# Patient Record
Sex: MALE | Race: AMERICAN INDIAN OR ALASKA NATIVE
[De-identification: names, ages, dates, MRNs, and addresses within clinical notes are randomized per-mention and may not be internally consistent; named-entity substitution may affect disease eponyms.]

---

## 2017-01-06 ENCOUNTER — HOSPITAL ENCOUNTER (INPATIENT)
Dept: HOSPITAL 5 - ED | Age: 32
LOS: 4 days | Discharge: HOME | DRG: 203 | End: 2017-01-10
Attending: HOSPITALIST | Admitting: INTERNAL MEDICINE
Payer: COMMERCIAL

## 2017-01-06 DIAGNOSIS — F17.210: ICD-10-CM

## 2017-01-06 DIAGNOSIS — Z82.49: ICD-10-CM

## 2017-01-06 DIAGNOSIS — J45.901: Primary | ICD-10-CM

## 2017-01-06 DIAGNOSIS — Z98.890: ICD-10-CM

## 2017-01-06 LAB
ANION GAP SERPL CALC-SCNC: 19 MMOL/L
APTT BLD: 35 SEC. (ref 24.2–36.6)
BASOPHILS NFR BLD AUTO: 0.4 % (ref 0–1.8)
BUN SERPL-MCNC: 18 MG/DL (ref 9–20)
BUN/CREAT SERPL: 12 %
CALCIUM SERPL-MCNC: 9.2 MG/DL (ref 8.4–10.2)
CHLORIDE SERPL-SCNC: 99.2 MMOL/L (ref 98–107)
CK SERPL-CCNC: 212 UNITS/L (ref 55–170)
CO2 SERPL-SCNC: 23 MMOL/L (ref 22–30)
EOSINOPHIL NFR BLD AUTO: 0.5 % (ref 0–4.3)
GLUCOSE SERPL-MCNC: 93 MG/DL (ref 75–100)
HCT VFR BLD CALC: 46.4 % (ref 35.5–45.6)
HGB BLD-MCNC: 15.6 GM/DL (ref 11.8–15.2)
INR PPP: 1.05 (ref 0.87–1.13)
MCH RBC QN AUTO: 27 PG (ref 28–32)
MCHC RBC AUTO-ENTMCNC: 34 % (ref 32–34)
MCV RBC AUTO: 81 FL (ref 84–94)
PLATELET # BLD: 215 K/MM3 (ref 140–440)
POTASSIUM SERPL-SCNC: 4.3 MMOL/L (ref 3.6–5)
RBC # BLD AUTO: 5.75 M/MM3 (ref 3.65–5.03)
SODIUM SERPL-SCNC: 137 MMOL/L (ref 137–145)
WBC # BLD AUTO: 12.3 K/MM3 (ref 4.5–11)

## 2017-01-06 PROCEDURE — 93005 ELECTROCARDIOGRAM TRACING: CPT

## 2017-01-06 PROCEDURE — 96366 THER/PROPH/DIAG IV INF ADDON: CPT

## 2017-01-06 PROCEDURE — 36415 COLL VENOUS BLD VENIPUNCTURE: CPT

## 2017-01-06 PROCEDURE — 93010 ELECTROCARDIOGRAM REPORT: CPT

## 2017-01-06 PROCEDURE — 82553 CREATINE MB FRACTION: CPT

## 2017-01-06 PROCEDURE — 85610 PROTHROMBIN TIME: CPT

## 2017-01-06 PROCEDURE — 90686 IIV4 VACC NO PRSV 0.5 ML IM: CPT

## 2017-01-06 PROCEDURE — 96365 THER/PROPH/DIAG IV INF INIT: CPT

## 2017-01-06 PROCEDURE — 84484 ASSAY OF TROPONIN QUANT: CPT

## 2017-01-06 PROCEDURE — 96367 TX/PROPH/DG ADDL SEQ IV INF: CPT

## 2017-01-06 PROCEDURE — 85730 THROMBOPLASTIN TIME PARTIAL: CPT

## 2017-01-06 PROCEDURE — 80048 BASIC METABOLIC PNL TOTAL CA: CPT

## 2017-01-06 PROCEDURE — 82140 ASSAY OF AMMONIA: CPT

## 2017-01-06 PROCEDURE — 94640 AIRWAY INHALATION TREATMENT: CPT

## 2017-01-06 PROCEDURE — 85025 COMPLETE CBC W/AUTO DIFF WBC: CPT

## 2017-01-06 PROCEDURE — 99406 BEHAV CHNG SMOKING 3-10 MIN: CPT

## 2017-01-06 PROCEDURE — 83880 ASSAY OF NATRIURETIC PEPTIDE: CPT

## 2017-01-06 PROCEDURE — 85007 BL SMEAR W/DIFF WBC COUNT: CPT

## 2017-01-06 PROCEDURE — 96375 TX/PRO/DX INJ NEW DRUG ADDON: CPT

## 2017-01-06 PROCEDURE — 82550 ASSAY OF CK (CPK): CPT

## 2017-01-06 PROCEDURE — 71275 CT ANGIOGRAPHY CHEST: CPT

## 2017-01-06 PROCEDURE — 90732 PPSV23 VACC 2 YRS+ SUBQ/IM: CPT

## 2017-01-06 PROCEDURE — 71020: CPT

## 2017-01-06 RX ADMIN — IPRATROPIUM BROMIDE ONE MG: 0.5 SOLUTION RESPIRATORY (INHALATION) at 16:28

## 2017-01-06 RX ADMIN — IPRATROPIUM BROMIDE ONE MG: 0.5 SOLUTION RESPIRATORY (INHALATION) at 19:44

## 2017-01-06 NOTE — EMERGENCY DEPARTMENT REPORT
Addendum entered and electronically signed by DOC HUTCHINSON PA  01/06/17 19:10

: 








Blank Doc





- Documentation


Documentation: 





Charged nurse notified the patient's pulse ox is 95% and needs supplemental 

oxygen.  I also informed her of that patient will needs to come back to main ED 

immediately.  Patient remained short of breath after respiratory treatment.  

Albuterol nebulizer ordered and CT angiogram of the chest ordered.  Patient 

notified as additional treatment plan. O2 sat  97% on 2 L of oxygen.





Original Note:





Chief Complaint: Dyspnea/Respdistress


Stated Complaint: ASTHMA/VOMITING/SOB/CHILLS


Time Seen by Provider: 01/06/17 16:12





- HPI


History of Present Illness: 





Patient here reports difficulty breathing and was diagnosed with pneumonia in 12 -2016.  He said he was diagnosed at [facility].  He reports that he is 

having fever and chills.  Patient is a positive smoker.  Patient reports that 

he has asthma and he uses albuterol nebulizer and inhaler.  O2 sat is coughing 

and is having some tightness in his chest that he usually gets with his asthma 

but he feels like he has pneumonia.  Patient reports nausea.





- ROS


Review of Systems: 





All systems are negative unless stated in HPI above.





- Exam


Vital Signs: 


 Vital Signs











  01/06/17





  15:12


 


Temperature 99.0 F


 


Pulse Rate 124 H


 


Respiratory 24





Rate 


 


Blood Pressure 139/80


 


O2 Sat by Pulse 96





Oximetry 











Physical Exam: 





General: This is a 31-year-old male well-nourished well-developed in no acute 

distress.


Lungs: Dry cough, mild increased work of breathing.  No wheezing.


CV: Tachycardic at 124, regular rhythm.  S1-S2


MSE screening note: 


Focused history and physical exam performed.


Due to findings the following was ordered:See [facility]











ED Medical Decision Making





- Medical Decision Making





Medical decision making: Patient seen by provider in triage area.  Appropriate 

protocol activated and patient to main ED to be seen by physician.





ED Disposition for MSE


Condition: Stable

## 2017-01-06 NOTE — XRAY REPORT
FINAL REPORT



EXAM:  XR CHEST ROUTINE 2V



HISTORY:  Shortness of breath 



TECHNIQUE:  PA and lateral views of the chest



PRIORS:  CT chest 01/06/2017



FINDINGS:  

Lines, tubes, and devices: N/A



Lungs and pleura: Trachea is normal in position.  Lungs are clear

of infiltrate, pleural effusion, vascular congestion, or

pneumothorax. The tiny 6 mm left upper lobe nodule seen on recent

CT is not well-visualized by x-ray. This should be followed with

CT. 



Cardiomediastinal silhouette: Cardiac and mediastinal silhouettes

are unremarkable.



Other: Bony structures are intact.



IMPRESSION:  

No acute cardiopulmonary process seen.

## 2017-01-06 NOTE — CAT SCAN REPORT
FINAL REPORT



EXAM:  CT ANGIO CHEST



HISTORY:  SOB 



TECHNIQUE:  Enhanced CT of the chest at 2.5 mm axial intervals

following a pulmonary embolism protocol. Coronal and sagittal

imaging were also obtained. 



Contrast: 100 ml of Omnipaque 350 given IV.



PRIORS:  CXR 01/06/2017



FINDINGS:  

There is no evidence for pulmonary embolism in the main pulmonary

artery, right and left pulmonary arteries or their major

distributions. However, CT does not exclude distal pulmonary

emboli.



There is a nonspecific subpleural 6 mm nodule in the left upper

lobe (coronal image 40, axial image 57). Otherwise, the lung

parenchyma are expanded and clear with no evidence for

parenchymal infiltrates, congestion, or pleural effusion.  There

is no evidence for mediastinal, hilar, or axillary adenopathy.

Cardiovascular structures are within normal limits.



Images through the lung bases include the upper abdomen which

show no abnormalities of the visualized abdominal viscera.



Bony structures demonstrate no focal abnormalities.



IMPRESSION:  

1. no evidence for pulmonary embolism. 



2. nonspecific uncalcified nodule in the left upper lobe

laterally. Given the size, follow-up CT in 1 years recommended

unless the patient is a high-risk patient (history of smoking or

primary neoplasm). In the latter instance, follow-up CT in 6 and

12 months is warranted.

## 2017-01-06 NOTE — EMERGENCY DEPARTMENT REPORT
HPI





- General


Chief Complaint: Dyspnea/Respdistress


Time Seen by Provider: 01/06/17 16:12





- HPI


HPI: 


Room 22





The patient is a 31-year-old male presenting with a chief complaint of chest 

pain and shortness of breath.  The patient states yesterday he developed 

symptoms similar to his previous bout of pneumonia which included anterior 

chest pain that feels as though something is staying on his chest associated 

with shortness of breath and a cough productive of green sputum.  Patient does 

admit to nausea vomiting.  Patient gives his pain a score of 6/10.  The patient 

denies any recent flights/car trips





Location: Chest


Duration: Constant since yesterday


Quality: Pressure


Severity: 6/10


Modifying factors: [see above]


Context: [see above]


Mode of transportation: [not driving]








ED Past Medical Hx





- Past Medical History


Previous Medical History?: No


Hx Asthma: Yes





- Surgical History


Past Surgical History?: Yes


Additional Surgical History: Hernia repair





- Family History


Family history: no significant





- Social History


Smoking Status: Current Every Day Smoker


Substance Use Type: Alcohol (occasional)





- Medications


Home Medications: 


 Home Medications











 Medication  Instructions  Recorded  Confirmed  Last Taken  Type


 


ALBUTEROL Inhaler 2 puff IH Q4H 10/10/16 10/10/16 Unknown History














ED Review of Systems


ROS: 


Stated complaint: ASTHMA/VOMITING/SOB/CHILLS


Other details as noted in HPI





Comment: All other systems reviewed and negative


Constitutional: denies: chills, fever


Eyes: denies: eye pain, eye discharge, vision change


ENT: denies: ear pain, throat pain


Respiratory: cough, shortness of breath, wheezing


Cardiovascular: denies: palpitations


Endocrine: no symptoms reported


Gastrointestinal: nausea, vomiting


Genitourinary: denies: urgency, dysuria


Musculoskeletal: denies: back pain, joint swelling, arthralgia


Skin: denies: rash, lesions


Neurological: denies: headache, weakness, paresthesias


Psychiatric: denies: anxiety, depression


Hematological/Lymphatic: denies: easy bleeding, easy bruising





Physical Exam





- Physical Exam


Vital Signs: 


 Vital Signs











  01/06/17





  15:12


 


Temperature 99.0 F


 


Pulse Rate 124 H


 


Respiratory 24





Rate 


 


Blood Pressure 139/80


 


O2 Sat by Pulse 96





Oximetry 











Physical Exam: 


GENERAL: The patient is well-developed well-nourished male lying on stretcher 

appearing in mild discomfort. []


HEENT: Normocephalic.  Atraumatic.  Extraocular motions are intact.  Patient 

has moist mucous membranes.


NECK: Supple.  Trachea midline


CHEST/LUNGS: Diminished breath sounds throughout.  


HEART/CARDIOVASCULAR: Regular.  There is tachycardia.  There is no gallop rub 

or murmur.


ABDOMEN: Abdomen is soft, nontender.  Patient has normal bowel sounds.  There 

is no abdominal distention.


SKIN: There is no rash.  There is no edema.  There is no diaphoresis.


NEURO: The patient is awake, alert, and oriented.  The patient is cooperative. 

  The patient has normal speech


MUSCULOSKELETAL:  There is no evidence of acute injury.








ED Course


 Vital Signs











  01/06/17





  15:12


 


Temperature 99.0 F


 


Pulse Rate 124 H


 


Respiratory 24





Rate 


 


Blood Pressure 139/80


 


O2 Sat by Pulse 96





Oximetry 














ED Medical Decision Making





- Lab Data


Result diagrams: 


 01/06/17 16:27





 01/06/17 16:27





 Laboratory Tests











  01/06/17 01/06/17 01/06/17





  16:27 16:27 16:27


 


WBC   12.3 H 


 


RBC   5.75 H 


 


Hgb   15.6 H 


 


Hct   46.4 H 


 


MCV   81 L 


 


MCH   27 L 


 


MCHC   34 


 


RDW   14.8 


 


Plt Count   215 


 


Lymph % (Auto)   3.6 L 


 


Mono % (Auto)   12.3 H 


 


Eos % (Auto)   0.5 


 


Baso % (Auto)   0.4 


 


Lymph #   0.4 L 


 


Mono #   1.5 H 


 


Eos #   0.1 


 


Baso #   0.1 


 


Seg Neutrophils %   83.2 H 


 


Seg Neutrophils #   10.2 H 


 


PT   


 


INR   


 


APTT   


 


Sodium  137  


 


Potassium  4.3  


 


Chloride  99.2  


 


Carbon Dioxide  23  


 


Anion Gap  19  


 


BUN  18  


 


Creatinine  1.5  


 


Estimated GFR  > 60  


 


BUN/Creatinine Ratio  12.00  


 


Glucose  93  


 


Lactic Acid    1.5


 


Calcium  9.2  


 


Total Creatine Kinase   


 


CK-MB (CK-2)   


 


CK-MB (CK-2) Rel Index   


 


Troponin T   


 


NT-Pro-B Natriuret Pep   














  01/06/17 01/06/17





  19:10 19:10


 


WBC  


 


RBC  


 


Hgb  


 


Hct  


 


MCV  


 


MCH  


 


MCHC  


 


RDW  


 


Plt Count  


 


Lymph % (Auto)  


 


Mono % (Auto)  


 


Eos % (Auto)  


 


Baso % (Auto)  


 


Lymph #  


 


Mono #  


 


Eos #  


 


Baso #  


 


Seg Neutrophils %  


 


Seg Neutrophils #  


 


PT   13.6


 


INR   1.05


 


APTT   35.0


 


Sodium  


 


Potassium  


 


Chloride  


 


Carbon Dioxide  


 


Anion Gap  


 


BUN  


 


Creatinine  


 


Estimated GFR  


 


BUN/Creatinine Ratio  


 


Glucose  


 


Lactic Acid  


 


Calcium  


 


Total Creatine Kinase  212 H 


 


CK-MB (CK-2)  1.3 


 


CK-MB (CK-2) Rel Index  0.6 


 


Troponin T  < 0.010 


 


NT-Pro-B Natriuret Pep  5.73 

















- EKG Data


-: EKG Interpreted by Me


EKG shows normal: sinus rhythm


Rate: normal





- EKG Data


When compared to previous EKG there are: previous EKG unavailable


Interpretation: other (no ischemic changes seen)





- Radiology Data


Radiology results: report reviewed (CT chest), image reviewed (chest x-ray, CT 

chest)


interpreted by me: 


Chest x-ray-no focal infiltrates, no pneumothorax





CT chest (read by radiologist)-no evidence for pulmonary embolism.  Nonspecific 

noncalcified nodule in the left upper lobe laterally.  Given the size, follow-

up CT in 1 year is recommended unless the patient is at high risk patient.  In 

the latter instance, follow-up CT in 6 and 12 months is warranted








- Differential Diagnosis


acute asthma exacerbation, pneumonia, pneumothorax, PE


Critical care attestation.: 


If time is entered above; I have spent that time in minutes in the direct care 

of this critically ill patient, excluding procedure time.








ED Disposition


Clinical Impression: 


 Shortness of breath, Acute asthma exacerbation


Disposition: OP ADMITTED AS IP TO THIS HOSP


Is pt being admited?: Yes


Does the pt Need Aspirin: Yes


Condition: Fair


Time of Disposition: 21:48 (hospitalist notified)

## 2017-01-06 NOTE — HISTORY AND PHYSICAL REPORT
History of Present Illness


Chief complaint: 


shortness of breath





History of present illness: 


32 YO Male with Asthma, Nicotine Dependence presents to ED for evaluation. Pt 

states that he experienced  shortness of breath over the past day.  The patient 

states symptoms began yesterday after smoking cigarettes. Pt states that 

symptoms are accompanied with dry cough, productive of green sputum, and 

soreness in his chest after coughing. Pt denies fever, chills, CP, palpitations

, NVD, recent ill contacts. 





Past History


Past Medical History: other (asthma)


Past Surgical History: hernia repair


Social history: single, smoking.  denies: alcohol abuse, prescription drug abuse


Family history: hypertension





Medications and Allergies


 Allergies











Allergy/AdvReac Type Severity Reaction Status Date / Time


 


No Known Allergies Allergy   Unverified 10/10/16 00:25











 Home Medications











 Medication  Instructions  Recorded  Confirmed  Last Taken  Type


 


ALBUTEROL Inhaler 2 puff IH Q4H PRN 10/10/16 01/06/17 1 Day Ago History





    2 














Review of Systems


All systems: negative


Respiratory: cough, shortness of breath





Exam





- Constitutional


Vitals: 


 











Temp Pulse Resp BP Pulse Ox


 


 99.9 F H  121 H  16   142/79   100 


 


 01/06/17 19:15  01/06/17 20:56  01/06/17 20:56  01/06/17 20:56  01/06/17 20:56











General appearance: Present: no acute distress, well-nourished





- EENT


Eyes: Present: PERRL


ENT: hearing intact, clear oral mucosa





- Neck


Neck: Present: supple, normal ROM





- Respiratory


Respiratory effort: normal


Respiratory: bilateral: CTA





- Cardiovascular


Heart Sounds: Present: S1 & S2.  Absent: rub, click





- Extremities


Extremities: pulses symmetrical, No edema


Peripheral Pulses: within normal limits





- Abdominal


General gastrointestinal: Present: soft, non-tender, non-distended, normal 

bowel sounds


Male genitourinary: Present: normal





- Integumentary


Integumentary: Present: clear, warm, dry





- Musculoskeletal


Musculoskeletal: gait normal, strength equal bilaterally





- Psychiatric


Psychiatric: appropriate mood/affect, intact judgment & insight





- Neurologic


Neurologic: CNII-XII intact, moves all extremities





Results





- Labs


CBC & Chem 7: 


 01/07/17 05:31





 01/06/17 16:27


Labs: 


 Abnormal lab results











  01/06/17 01/06/17 Range/Units





  16:27 19:10 


 


WBC  12.3 H   (4.5-11.0)  K/mm3


 


RBC  5.75 H   (3.65-5.03)  M/mm3


 


Hgb  15.6 H   (11.8-15.2)  gm/dl


 


Hct  46.4 H   (35.5-45.6)  %


 


MCV  81 L   (84-94)  fl


 


MCH  27 L   (28-32)  pg


 


Lymph % (Auto)  3.6 L   (13.4-35.0)  %


 


Mono % (Auto)  12.3 H   (0.0-7.3)  %


 


Lymph #  0.4 L   (1.2-5.4)  K/mm3


 


Mono #  1.5 H   (0.0-0.8)  K/mm3


 


Seg Neutrophils %  83.2 H   (40.0-70.0)  %


 


Seg Neutrophils #  10.2 H   (1.8-7.7)  K/mm3


 


Total Creatine Kinase   212 H  ()  units/L














Assessment and Plan





- Patient Problems


(1) Acute asthma exacerbation


Current Visit: Yes   Status: Acute   


Plan to address problem: 


Asthma Exacerbation Protocol: Iv abx, steroids, nebulizer, supplemental oxygen, 

education regarding asthma triggers. 











(2) Nicotine dependence


Current Visit: Yes   Status: Acute   


Plan to address problem: 


Pt counseled











(3) DVT prophylaxis


Current Visit: Yes   Status: Acute

## 2017-01-07 LAB
ANISOCYTOSIS BLD QL SMEAR: (no result)
BLASTOCYTES % (MANUAL): 0 %
HCT VFR BLD CALC: 44.1 % (ref 35.5–45.6)
HGB BLD-MCNC: 14.3 GM/DL (ref 11.8–15.2)
MCH RBC QN AUTO: 26 PG (ref 28–32)
MCHC RBC AUTO-ENTMCNC: 32 % (ref 32–34)
MCV RBC AUTO: 81 FL (ref 84–94)
PLATELET # BLD: 212 K/MM3 (ref 140–440)
RBC # BLD AUTO: 5.48 M/MM3 (ref 3.65–5.03)
TOTAL CELLS COUNTED PERCENT: 9
WBC # BLD AUTO: 11.2 K/MM3 (ref 4.5–11)

## 2017-01-07 RX ADMIN — AZITHROMYCIN SCH MG: 250 TABLET, FILM COATED ORAL at 16:35

## 2017-01-07 RX ADMIN — IPRATROPIUM BROMIDE AND ALBUTEROL SULFATE SCH AMPUL: .5; 3 SOLUTION RESPIRATORY (INHALATION) at 14:32

## 2017-01-07 RX ADMIN — IPRATROPIUM BROMIDE AND ALBUTEROL SULFATE SCH AMPUL: .5; 3 SOLUTION RESPIRATORY (INHALATION) at 20:03

## 2017-01-07 RX ADMIN — ALBUTEROL SULFATE PRN MG: 2.5 SOLUTION RESPIRATORY (INHALATION) at 10:17

## 2017-01-07 RX ADMIN — ALBUTEROL SULFATE PRN MG: 2.5 SOLUTION RESPIRATORY (INHALATION) at 05:35

## 2017-01-07 RX ADMIN — ENOXAPARIN SODIUM SCH: 100 INJECTION SUBCUTANEOUS at 22:47

## 2017-01-07 RX ADMIN — IPRATROPIUM BROMIDE AND ALBUTEROL SULFATE SCH: .5; 3 SOLUTION RESPIRATORY (INHALATION) at 15:27

## 2017-01-07 NOTE — PROGRESS NOTE
Assessment and Plan


Assessment and plan: 





1.  Asthma exacerbation


Start antibiotic along with IV corticosteroids, inhaled bronchodilators and 

supplemental oxygen





2.  Tobacco use


Counseled regarding importance of quitting (10 minutes)





3.  Lung nodule


Needs repeat CT chest in 6-12 months





4.  DVT prophylaxis


Lovenox





History


Interval history: 





c/o SOB, wheezing, difficulty breathing, chest tightness





Hospitalist Physical





- Constitutional


Vitals: 


 











Temp Pulse Resp BP Pulse Ox


 


 99.5 F   118 H  14   119/78   100 


 


 01/07/17 14:59  01/07/17 14:59  01/07/17 14:59  01/07/17 14:59  01/07/17 14:59











General appearance: Present: no acute distress, well-nourished





- EENT


Eyes: Present: PERRL, EOM intact.  Absent: scleral icterus, conjunctival 

injection





- Neck


Neck: Present: supple, normal ROM.  Absent: masses or JVD





- Respiratory


Respiratory effort: labored


Respiratory: bilateral: diminished, wheezing, negative: rales, rhonchi





- Cardiovascular


Rhythm: other (tachycardic)


Heart Sounds: Present: S1 & S2.  Absent: systolic murmur





- Extremities


Extremities: no ischemia





- Abdominal


General gastrointestinal: soft, non-tender, non-distended, normal bowel sounds





- Integumentary


Integumentary: Present: warm, dry.  Absent: jaundice, rash





- Psychiatric


Psychiatric: cooperative





- Neurologic


Neurologic: CNII-XII intact, no focal deficits





Results





- Labs


CBC & Chem 7: 


 01/07/17 05:31





 01/06/17 16:27


Labs: 


 Laboratory Last Values











WBC  11.2 K/mm3 (4.5-11.0)  H  01/07/17  05:31    


 


RBC  5.48 M/mm3 (3.65-5.03)  H  01/07/17  05:31    


 


Hgb  14.3 gm/dl (11.8-15.2)   01/07/17  05:31    


 


Hct  44.1 % (35.5-45.6)   01/07/17  05:31    


 


MCV  81 fl (84-94)  L  01/07/17  05:31    


 


MCH  26 pg (28-32)  L  01/07/17  05:31    


 


MCHC  32 % (32-34)   01/07/17  05:31    


 


RDW  15.0 % (13.2-15.2)   01/07/17  05:31    


 


Plt Count  212 K/mm3 (140-440)   01/07/17  05:31    


 


Lymph % (Auto)  3.6 % (13.4-35.0)  L  01/06/17  16:27    


 


Mono % (Auto)  12.3 % (0.0-7.3)  H  01/06/17  16:27    


 


Eos % (Auto)  0.5 % (0.0-4.3)   01/06/17  16:27    


 


Baso % (Auto)  0.4 % (0.0-1.8)   01/06/17  16:27    


 


Lymph #  0.4 K/mm3 (1.2-5.4)  L  01/06/17  16:27    


 


Mono #  1.5 K/mm3 (0.0-0.8)  H  01/06/17  16:27    


 


Eos #  0.1 K/mm3 (0.0-0.4)   01/06/17  16:27    


 


Baso #  0.1 K/mm3 (0.0-0.1)   01/06/17  16:27    


 


Add Manual Diff  Complete   01/07/17  05:31    


 


Total Counted  100   01/07/17  05:31    


 


Seg Neutrophils %  Np   01/07/17  05:31    


 


Seg Neuts % (Manual)  68.0 % (40.0-70.0)   01/07/17  05:31    


 


Band Neutrophils %  12.0 %  01/07/17  05:31    


 


Lymphocytes % (Manual)  11.0 % (13.4-35.0)  L  01/07/17  05:31    


 


Reactive Lymphs % (Man)  0 %  01/07/17  05:31    


 


Monocytes % (Manual)  9.0 % (0.0-7.3)  H  01/07/17  05:31    


 


Eosinophils % (Manual)  0 % (0.0-4.3)   01/07/17  05:31    


 


Basophils % (Manual)  0 % (0.0-1.8)   01/07/17  05:31    


 


Metamyelocytes %  0 %  01/07/17  05:31    


 


Myelocytes %  0 %  01/07/17  05:31    


 


Promyelocytes %  0 %  01/07/17  05:31    


 


Blast Cells %  0 %  01/07/17  05:31    


 


Nucleated RBC %  Not Reportable   01/07/17  05:31    


 


Seg Neutrophils #  10.2 K/mm3 (1.8-7.7)  H  01/06/17  16:27    


 


Seg Neutrophils # Man  7.6 K/mm3 (1.8-7.7)   01/07/17  05:31    


 


Band Neutrophils #  1.3 K/mm3  01/07/17  05:31    


 


Lymphocytes # (Manual)  1.2 K/mm3 (1.2-5.4)   01/07/17  05:31    


 


Abs React Lymphs (Man)  0.0 K/mm3  01/07/17  05:31    


 


Monocytes # (Manual)  1.0 K/mm3 (0.0-0.8)  H  01/07/17  05:31    


 


Eosinophils # (Manual)  0.0 K/mm3 (0.0-0.4)   01/07/17  05:31    


 


Basophils # (Manual)  0.0 K/mm3 (0.0-0.1)   01/07/17  05:31    


 


Metamyelocytes #  0.0 K/mm3  01/07/17  05:31    


 


Myelocytes #  0.0 K/mm3  01/07/17  05:31    


 


Promyelocytes #  0.0 K/mm3  01/07/17  05:31    


 


Blast Cells #  0.0 K/mm3  01/07/17  05:31    


 


WBC Morphology  Not Reportable   01/07/17  05:31    


 


Hypersegmented Neuts  Not Reportable   01/07/17  05:31    


 


Hyposegmented Neuts  Not Reportable   01/07/17  05:31    


 


Hypogranular Neuts  Not Reportable   01/07/17  05:31    


 


Smudge Cells  Not Reportable   01/07/17  05:31    


 


Toxic Granulation  Not Reportable   01/07/17  05:31    


 


Toxic Vacuolation  Not Reportable   01/07/17  05:31    


 


Dohle Bodies  Not Reportable   01/07/17  05:31    


 


Pelger-Huet Anomaly  Not Reportable   01/07/17  05:31    


 


Jose Rods  Not Reportable   01/07/17  05:31    


 


Platelet Estimate  Appe   01/07/17  05:31    


 


Clumped Platelets  Not Reportable   01/07/17  05:31    


 


Plt Clumps, EDTA  Not Reportable   01/07/17  05:31    


 


Large Platelets  Not Reportable   01/07/17  05:31    


 


Giant Platelets  Not Reportable   01/07/17  05:31    


 


Platelet Satelliting  Not Reportable   01/07/17  05:31    


 


Plt Morphology Comment  Not Reportable   01/07/17  05:31    


 


RBC Morphology  Not Reportable   01/07/17  05:31    


 


Dimorphic RBCs  Not Reportable   01/07/17  05:31    


 


Polychromasia  Not Reportable   01/07/17  05:31    


 


Hypochromasia  Not Reportable   01/07/17  05:31    


 


Poikilocytosis  Not Reportable   01/07/17  05:31    


 


Anisocytosis  1+   01/07/17  05:31    


 


Microcytosis  Not Reportable   01/07/17  05:31    


 


Macrocytosis  Not Reportable   01/07/17  05:31    


 


Spherocytes  Not Reportable   01/07/17  05:31    


 


Pappenheimer Bodies  Not Reportable   01/07/17  05:31    


 


Sickle Cells  Not Reportable   01/07/17  05:31    


 


Target Cells  Not Reportable   01/07/17  05:31    


 


Tear Drop Cells  Not Reportable   01/07/17  05:31    


 


Ovalocytes  Not Reportable   01/07/17  05:31    


 


Helmet Cells  Not Reportable   01/07/17  05:31    


 


Leal-Kennedale Bodies  Not Reportable   01/07/17  05:31    


 


Cabot Rings  Not Reportable   01/07/17  05:31    


 


Traci Cells  Not Reportable   01/07/17  05:31    


 


Bite Cells  Not Reportable   01/07/17  05:31    


 


Crenated Cell  Not Reportable   01/07/17  05:31    


 


Elliptocytes  Not Reportable   01/07/17  05:31    


 


Acanthocytes (Spur)  Not Reportable   01/07/17  05:31    


 


Rouleaux  Not Reportable   01/07/17  05:31    


 


Hemoglobin C Crystals  Not Reportable   01/07/17  05:31    


 


Schistocytes  Not Reportable   01/07/17  05:31    


 


Malaria parasites  Not Reportable   01/07/17  05:31    


 


Abdullahi Bodies  Not Reportable   01/07/17  05:31    


 


Hem Pathologist Commnt  No   01/07/17  05:31    


 


PT  13.6 Sec. (12.2-14.9)   01/06/17  19:10    


 


INR  1.05  (0.87-1.13)   01/06/17  19:10    


 


APTT  35.0 Sec. (24.2-36.6)   01/06/17  19:10    


 


Sodium  137 mmol/L (137-145)   01/06/17  16:27    


 


Potassium  4.3 mmol/L (3.6-5.0)   01/06/17  16:27    


 


Chloride  99.2 mmol/L ()   01/06/17  16:27    


 


Carbon Dioxide  23 mmol/L (22-30)   01/06/17  16:27    


 


Anion Gap  19 mmol/L  01/06/17  16:27    


 


BUN  18 mg/dL (9-20)   01/06/17  16:27    


 


Creatinine  1.5 mg/dL (0.8-1.5)   01/06/17  16:27    


 


Estimated GFR  > 60 ml/min  01/06/17  16:27    


 


BUN/Creatinine Ratio  12.00 %  01/06/17  16:27    


 


Glucose  93 mg/dL ()   01/06/17  16:27    


 


Lactic Acid  1.5 mmol/L (0.7-2.0)   01/06/17  16:27    


 


Calcium  9.2 mg/dL (8.4-10.2)   01/06/17  16:27    


 


Total Creatine Kinase  212 units/L ()  H  01/06/17  19:10    


 


CK-MB (CK-2)  1.3 ng/mL (0.0-4.0)   01/06/17  19:10    


 


CK-MB (CK-2) Rel Index  0.6  (0-4)   01/06/17  19:10    


 


Troponin T  < 0.010 ng/mL (0.00-0.029)   01/06/17  19:10    


 


NT-Pro-B Natriuret Pep  5.73 pg/mL (0-450)   01/06/17  19:10    














- Imaging and Cardiology


Chest x-ray: image reviewed (no acute findings)


CT scan - chest: report reviewed (no PE, 6 mm subpleural nodule left upper lung)

## 2017-01-07 NOTE — ADMIT CRITERIA FORM
Admission Criteria Documentation: 





                                             ASTHMA





Clinical Indications for Admission to Inpatient Care





                                                          (Place 'X' for any 

and all applicable criteria):





Admission is indicated for ANY ONE of the following  (1)(2)(3)(4)(5):


[ ]I.        Absent or markedly diminished breath sounds (silent chest)


[ ]II.       Oxygen saturation < 92%                                   


[ ]III.      PaCO2 = / > 42 mm Hg (5.6 kPa)


[ ]IV.     Peak expiratory flow rate < 40% of predicted or personal best after 

treatment.


[ ]V.      Peak expiratory flow rate < 33% of predicted or personal before 

after treatment


[ ]VI.     Change in mental status


[ ]VII.     Ventilatory support required  


[ ]VIII.    PaO2 < 60 mm Hg (8.0 kPa)                              


[ ]IX.      Cyanosis                                                           

  


[ ]X.       Cardiac dysrhythmia (e.g., bradycardia) 


[ ]XI.      Hemodynamic instability


[ ]XII.     Radiographic evidence of complication requiring inpatient treatment 

(e.g., pneumonia, pneumothorax)


[ X]XIII.    Inpatient admission required rather than observation care (also 

use Asthma: Observation Care guideline as appropriate) 


            because of ANY ONE of the following:


            [ X]a)    Respiratory finding that is severe or persistent (eg, 

dyspnea, tachypnea, accessory muscle use)


            [ ]b)    Airflow measurements less than 60% of predicted or 

personal best that persist (e.g., over 24 hours) or worsen despite treatments


            [ ]c)    Supplemental oxygen or respiratory treatments for over 24 

hours that are performable only in acute inpatient setting


            [ ]d)    Other condition, treatment or monitoring requiring 

inpatient admission.











Extended stay beyond goal length of stay may be needed for (26)(27)(28):


[ ]a)   Severe respiratory failure (23) (29) (30)           


[ ]b)   Secondary causes and complications (25)


[ ]c)   Status asthmaticus         


[ ]d)   Chronic obstructive asthma       


[ ]e)   Older patients (29)            


[ ]f)    Slow resolution                 


[ ]g)   Clinically significant exacerbation of comorbidities (eg, gay. heart 

failure, atrial fibrillation)











The original [employer] content created by [employer] has been revised. 


The portions of the content which have been revised are identified through the 

use of italic text or in bold, and [employer] 


has neither reviewed nor approved the modified material. All other unmodified 

content is copyright  [employer]





Please see references footnoted in the original [facility] edition 

2016








Admission Criteria Met: Yes

## 2017-01-08 RX ADMIN — IPRATROPIUM BROMIDE AND ALBUTEROL SULFATE SCH AMPUL: .5; 3 SOLUTION RESPIRATORY (INHALATION) at 07:27

## 2017-01-08 RX ADMIN — IPRATROPIUM BROMIDE AND ALBUTEROL SULFATE SCH AMPUL: .5; 3 SOLUTION RESPIRATORY (INHALATION) at 20:16

## 2017-01-08 RX ADMIN — AZITHROMYCIN SCH MG: 250 TABLET, FILM COATED ORAL at 09:44

## 2017-01-08 RX ADMIN — IPRATROPIUM BROMIDE AND ALBUTEROL SULFATE SCH AMPUL: .5; 3 SOLUTION RESPIRATORY (INHALATION) at 13:04

## 2017-01-08 NOTE — PROGRESS NOTE
Assessment and Plan


Assessment and plan: 





1.  Asthma exacerbation


Started on antibiotic along with IV corticosteroids, inhaled bronchodilators 

and supplemental oxygen


No significant improvement


Add mucinex as he associated productive cough





2.  Tobacco use


Counseled regarding importance of quitting (10 minutes)





3.  Lung nodule


Needs repeat CT chest in 6-12 months





4.  DVT prophylaxis


Lovenox





History


Interval history: 





still c/o SOB and wheezing, associated with productive cough





Hospitalist Physical





- Constitutional


Vitals: 


 











Temp Pulse Resp BP Pulse Ox


 


 98.1 F   117 H  20   120/66   94 


 


 01/08/17 08:00  01/08/17 13:15  01/08/17 13:15  01/08/17 08:00  01/08/17 09:50











General appearance: Present: no acute distress, well-nourished





- EENT


Eyes: Present: PERRL, EOM intact.  Absent: scleral icterus, conjunctival 

injection





- Neck


Neck: Present: supple, normal ROM.  Absent: masses or JVD





- Respiratory


Respiratory effort: normal


Respiratory: bilateral: diminished, wheezing, negative: rales





- Cardiovascular


Rhythm: other (tachycardic)


Heart Sounds: Present: S1 & S2.  Absent: systolic murmur





- Extremities


Extremities: no ischemia





- Abdominal


General gastrointestinal: soft, non-tender, non-distended, normal bowel sounds





- Psychiatric


Psychiatric: cooperative





- Neurologic


Neurologic: CNII-XII intact, no focal deficits





Results





- Labs


CBC & Chem 7: 


 01/07/17 05:31





 01/06/17 16:27


Labs: 


 Laboratory Last Values











WBC  11.2 K/mm3 (4.5-11.0)  H  01/07/17  05:31    


 


RBC  5.48 M/mm3 (3.65-5.03)  H  01/07/17  05:31    


 


Hgb  14.3 gm/dl (11.8-15.2)   01/07/17  05:31    


 


Hct  44.1 % (35.5-45.6)   01/07/17  05:31    


 


MCV  81 fl (84-94)  L  01/07/17  05:31    


 


MCH  26 pg (28-32)  L  01/07/17  05:31    


 


MCHC  32 % (32-34)   01/07/17  05:31    


 


RDW  15.0 % (13.2-15.2)   01/07/17  05:31    


 


Plt Count  212 K/mm3 (140-440)   01/07/17  05:31    


 


Lymph % (Auto)  3.6 % (13.4-35.0)  L  01/06/17  16:27    


 


Mono % (Auto)  12.3 % (0.0-7.3)  H  01/06/17  16:27    


 


Eos % (Auto)  0.5 % (0.0-4.3)   01/06/17  16:27    


 


Baso % (Auto)  0.4 % (0.0-1.8)   01/06/17  16:27    


 


Lymph #  0.4 K/mm3 (1.2-5.4)  L  01/06/17  16:27    


 


Mono #  1.5 K/mm3 (0.0-0.8)  H  01/06/17  16:27    


 


Eos #  0.1 K/mm3 (0.0-0.4)   01/06/17  16:27    


 


Baso #  0.1 K/mm3 (0.0-0.1)   01/06/17  16:27    


 


Add Manual Diff  Complete   01/07/17  05:31    


 


Total Counted  100   01/07/17  05:31    


 


Seg Neutrophils %  Np   01/07/17  05:31    


 


Seg Neuts % (Manual)  68.0 % (40.0-70.0)   01/07/17  05:31    


 


Band Neutrophils %  12.0 %  01/07/17  05:31    


 


Lymphocytes % (Manual)  11.0 % (13.4-35.0)  L  01/07/17  05:31    


 


Reactive Lymphs % (Man)  0 %  01/07/17  05:31    


 


Monocytes % (Manual)  9.0 % (0.0-7.3)  H  01/07/17  05:31    


 


Eosinophils % (Manual)  0 % (0.0-4.3)   01/07/17  05:31    


 


Basophils % (Manual)  0 % (0.0-1.8)   01/07/17  05:31    


 


Metamyelocytes %  0 %  01/07/17  05:31    


 


Myelocytes %  0 %  01/07/17  05:31    


 


Promyelocytes %  0 %  01/07/17  05:31    


 


Blast Cells %  0 %  01/07/17  05:31    


 


Nucleated RBC %  Not Reportable   01/07/17  05:31    


 


Seg Neutrophils #  10.2 K/mm3 (1.8-7.7)  H  01/06/17  16:27    


 


Seg Neutrophils # Man  7.6 K/mm3 (1.8-7.7)   01/07/17  05:31    


 


Band Neutrophils #  1.3 K/mm3  01/07/17  05:31    


 


Lymphocytes # (Manual)  1.2 K/mm3 (1.2-5.4)   01/07/17  05:31    


 


Abs React Lymphs (Man)  0.0 K/mm3  01/07/17  05:31    


 


Monocytes # (Manual)  1.0 K/mm3 (0.0-0.8)  H  01/07/17  05:31    


 


Eosinophils # (Manual)  0.0 K/mm3 (0.0-0.4)   01/07/17  05:31    


 


Basophils # (Manual)  0.0 K/mm3 (0.0-0.1)   01/07/17  05:31    


 


Metamyelocytes #  0.0 K/mm3  01/07/17  05:31    


 


Myelocytes #  0.0 K/mm3  01/07/17  05:31    


 


Promyelocytes #  0.0 K/mm3  01/07/17  05:31    


 


Blast Cells #  0.0 K/mm3  01/07/17  05:31    


 


WBC Morphology  Not Reportable   01/07/17  05:31    


 


Hypersegmented Neuts  Not Reportable   01/07/17  05:31    


 


Hyposegmented Neuts  Not Reportable   01/07/17  05:31    


 


Hypogranular Neuts  Not Reportable   01/07/17  05:31    


 


Smudge Cells  Not Reportable   01/07/17  05:31    


 


Toxic Granulation  Not Reportable   01/07/17  05:31    


 


Toxic Vacuolation  Not Reportable   01/07/17  05:31    


 


Dohle Bodies  Not Reportable   01/07/17  05:31    


 


Pelger-Huet Anomaly  Not Reportable   01/07/17  05:31    


 


Jose Rods  Not Reportable   01/07/17  05:31    


 


Platelet Estimate  Appe   01/07/17  05:31    


 


Clumped Platelets  Not Reportable   01/07/17  05:31    


 


Plt Clumps, EDTA  Not Reportable   01/07/17  05:31    


 


Large Platelets  Not Reportable   01/07/17  05:31    


 


Giant Platelets  Not Reportable   01/07/17  05:31    


 


Platelet Satelliting  Not Reportable   01/07/17  05:31    


 


Plt Morphology Comment  Not Reportable   01/07/17  05:31    


 


RBC Morphology  Not Reportable   01/07/17  05:31    


 


Dimorphic RBCs  Not Reportable   01/07/17  05:31    


 


Polychromasia  Not Reportable   01/07/17  05:31    


 


Hypochromasia  Not Reportable   01/07/17  05:31    


 


Poikilocytosis  Not Reportable   01/07/17  05:31    


 


Anisocytosis  1+   01/07/17  05:31    


 


Microcytosis  Not Reportable   01/07/17  05:31    


 


Macrocytosis  Not Reportable   01/07/17  05:31    


 


Spherocytes  Not Reportable   01/07/17  05:31    


 


Pappenheimer Bodies  Not Reportable   01/07/17  05:31    


 


Sickle Cells  Not Reportable   01/07/17  05:31    


 


Target Cells  Not Reportable   01/07/17  05:31    


 


Tear Drop Cells  Not Reportable   01/07/17  05:31    


 


Ovalocytes  Not Reportable   01/07/17  05:31    


 


Helmet Cells  Not Reportable   01/07/17  05:31    


 


Le[place] Bodies  Not Reportable   01/07/17  05:31    


 


Cabot Rings  Not Reportable   01/07/17  05:31    


 


[place] Cells  Not Reportable   01/07/17  05:31    


 


Bite Cells  Not Reportable   01/07/17  05:31    


 


Crenated Cell  Not Reportable   01/07/17  05:31    


 


Elliptocytes  Not Reportable   01/07/17  05:31    


 


Acanthocytes (Spur)  Not Reportable   01/07/17  05:31    


 


Rouleaux  Not Reportable   01/07/17  05:31    


 


Hemoglobin C Crystals  Not Reportable   01/07/17  05:31    


 


Schistocytes  Not Reportable   01/07/17  05:31    


 


Malaria parasites  Not Reportable   01/07/17  05:31    


 


Abdullahi Bodies  Not Reportable   01/07/17  05:31    


 


Hem Pathologist Commnt  No   01/07/17  05:31    


 


PT  13.6 Sec. (12.2-14.9)   01/06/17  19:10    


 


INR  1.05  (0.87-1.13)   01/06/17  19:10    


 


APTT  35.0 Sec. (24.2-36.6)   01/06/17  19:10    


 


Sodium  137 mmol/L (137-145)   01/06/17  16:27    


 


Potassium  4.3 mmol/L (3.6-5.0)   01/06/17  16:27    


 


Chloride  99.2 mmol/L ()   01/06/17  16:27    


 


Carbon Dioxide  23 mmol/L (22-30)   01/06/17  16:27    


 


Anion Gap  19 mmol/L  01/06/17  16:27    


 


BUN  18 mg/dL (9-20)   01/06/17  16:27    


 


Creatinine  1.5 mg/dL (0.8-1.5)   01/06/17  16:27    


 


Estimated GFR  > 60 ml/min  01/06/17  16:27    


 


BUN/Creatinine Ratio  12.00 %  01/06/17  16:27    


 


Glucose  93 mg/dL ()   01/06/17  16:27    


 


Lactic Acid  1.5 mmol/L (0.7-2.0)   01/06/17  16:27    


 


Calcium  9.2 mg/dL (8.4-10.2)   01/06/17  16:27    


 


Total Creatine Kinase  212 units/L ()  H  01/06/17  19:10    


 


CK-MB (CK-2)  1.3 ng/mL (0.0-4.0)   01/06/17  19:10    


 


CK-MB (CK-2) Rel Index  0.6  (0-4)   01/06/17  19:10    


 


Troponin T  < 0.010 ng/mL (0.00-0.029)   01/06/17  19:10    


 


NT-Pro-B Natriuret Pep  5.73 pg/mL (0-450)   01/06/17  19:10

## 2017-01-09 RX ADMIN — IPRATROPIUM BROMIDE AND ALBUTEROL SULFATE SCH AMPUL: .5; 3 SOLUTION RESPIRATORY (INHALATION) at 20:16

## 2017-01-09 RX ADMIN — AZITHROMYCIN SCH MG: 250 TABLET, FILM COATED ORAL at 10:55

## 2017-01-09 RX ADMIN — GUAIFENESIN SCH MG: 600 TABLET ORAL at 00:21

## 2017-01-09 RX ADMIN — IPRATROPIUM BROMIDE AND ALBUTEROL SULFATE SCH AMPUL: .5; 3 SOLUTION RESPIRATORY (INHALATION) at 09:24

## 2017-01-09 RX ADMIN — ENOXAPARIN SODIUM SCH: 100 INJECTION SUBCUTANEOUS at 22:59

## 2017-01-09 RX ADMIN — GUAIFENESIN SCH MG: 600 TABLET ORAL at 10:55

## 2017-01-09 RX ADMIN — IPRATROPIUM BROMIDE AND ALBUTEROL SULFATE SCH AMPUL: .5; 3 SOLUTION RESPIRATORY (INHALATION) at 14:08

## 2017-01-09 RX ADMIN — ENOXAPARIN SODIUM SCH: 100 INJECTION SUBCUTANEOUS at 01:15

## 2017-01-09 RX ADMIN — GUAIFENESIN SCH MG: 600 TABLET ORAL at 22:58

## 2017-01-09 NOTE — PROGRESS NOTE
Assessment and Plan


Assessment and plan: 





1.  Asthma exacerbation


Started on antibiotic along with IV corticosteroids, inhaled bronchodilators 

and supplemental oxygen


Slight improvement, plan to switch corticosteroids to by mouth tomorrow





2.  Tobacco use


Counseled regarding importance of quitting 





3.  Lung nodule


Needs repeat CT chest in 6-12 months





4.  DVT prophylaxis


Lovenox





History


Interval history: 





slight improvement, feeling better, still wheezing





Hospitalist Physical





- Constitutional


Vitals: 


 











Temp Pulse Resp BP Pulse Ox


 


 98.4 F   112 H  16   110/54   96 


 


 01/09/17 08:00  01/09/17 14:21  01/09/17 14:21  01/09/17 08:00  01/09/17 09:26











General appearance: Present: no acute distress, well-nourished





- EENT


Eyes: Present: PERRL, EOM intact.  Absent: scleral icterus, conjunctival 

injection





- Neck


Neck: Present: supple, normal ROM.  Absent: masses or JVD





- Respiratory


Respiratory effort: normal


Respiratory: bilateral: diminished, wheezing (exp wheezes), negative: rales, 

rhonchi





- Cardiovascular


Rhythm: regular


Heart Sounds: Present: S1 & S2.  Absent: systolic murmur





- Extremities


Extremities: no ischemia





- Abdominal


General gastrointestinal: soft, non-tender, non-distended, normal bowel sounds





- Integumentary


Integumentary: Present: warm, dry.  Absent: jaundice, rash





- Psychiatric


Psychiatric: cooperative





- Neurologic


Neurologic: CNII-XII intact, no focal deficits





Results





- Labs


CBC & Chem 7: 


 01/07/17 05:31





 01/06/17 16:27


Labs: 


 Laboratory Last Values











WBC  11.2 K/mm3 (4.5-11.0)  H  01/07/17  05:31    


 


RBC  5.48 M/mm3 (3.65-5.03)  H  01/07/17  05:31    


 


Hgb  14.3 gm/dl (11.8-15.2)   01/07/17  05:31    


 


Hct  44.1 % (35.5-45.6)   01/07/17  05:31    


 


MCV  81 fl (84-94)  L  01/07/17  05:31    


 


MCH  26 pg (28-32)  L  01/07/17  05:31    


 


MCHC  32 % (32-34)   01/07/17  05:31    


 


RDW  15.0 % (13.2-15.2)   01/07/17  05:31    


 


Plt Count  212 K/mm3 (140-440)   01/07/17  05:31    


 


Lymph % (Auto)  3.6 % (13.4-35.0)  L  01/06/17  16:27    


 


Mono % (Auto)  12.3 % (0.0-7.3)  H  01/06/17  16:27    


 


Eos % (Auto)  0.5 % (0.0-4.3)   01/06/17  16:27    


 


Baso % (Auto)  0.4 % (0.0-1.8)   01/06/17  16:27    


 


Lymph #  0.4 K/mm3 (1.2-5.4)  L  01/06/17  16:27    


 


Mono #  1.5 K/mm3 (0.0-0.8)  H  01/06/17  16:27    


 


Eos #  0.1 K/mm3 (0.0-0.4)   01/06/17  16:27    


 


Baso #  0.1 K/mm3 (0.0-0.1)   01/06/17  16:27    


 


Add Manual Diff  Complete   01/07/17  05:31    


 


Total Counted  100   01/07/17  05:31    


 


Seg Neutrophils %  Np   01/07/17  05:31    


 


Seg Neuts % (Manual)  68.0 % (40.0-70.0)   01/07/17  05:31    


 


Band Neutrophils %  12.0 %  01/07/17  05:31    


 


Lymphocytes % (Manual)  11.0 % (13.4-35.0)  L  01/07/17  05:31    


 


Reactive Lymphs % (Man)  0 %  01/07/17  05:31    


 


Monocytes % (Manual)  9.0 % (0.0-7.3)  H  01/07/17  05:31    


 


Eosinophils % (Manual)  0 % (0.0-4.3)   01/07/17  05:31    


 


Basophils % (Manual)  0 % (0.0-1.8)   01/07/17  05:31    


 


Metamyelocytes %  0 %  01/07/17  05:31    


 


Myelocytes %  0 %  01/07/17  05:31    


 


Promyelocytes %  0 %  01/07/17  05:31    


 


Blast Cells %  0 %  01/07/17  05:31    


 


Nucleated RBC %  Not Reportable   01/07/17  05:31    


 


Seg Neutrophils #  10.2 K/mm3 (1.8-7.7)  H  01/06/17  16:27    


 


Seg Neutrophils # Man  7.6 K/mm3 (1.8-7.7)   01/07/17  05:31    


 


Band Neutrophils #  1.3 K/mm3  01/07/17  05:31    


 


Lymphocytes # (Manual)  1.2 K/mm3 (1.2-5.4)   01/07/17  05:31    


 


Abs React Lymphs (Man)  0.0 K/mm3  01/07/17  05:31    


 


Monocytes # (Manual)  1.0 K/mm3 (0.0-0.8)  H  01/07/17  05:31    


 


Eosinophils # (Manual)  0.0 K/mm3 (0.0-0.4)   01/07/17  05:31    


 


Basophils # (Manual)  0.0 K/mm3 (0.0-0.1)   01/07/17  05:31    


 


Metamyelocytes #  0.0 K/mm3  01/07/17  05:31    


 


Myelocytes #  0.0 K/mm3  01/07/17  05:31    


 


Promyelocytes #  0.0 K/mm3  01/07/17  05:31    


 


Blast Cells #  0.0 K/mm3  01/07/17  05:31    


 


WBC Morphology  Not Reportable   01/07/17  05:31    


 


Hypersegmented Neuts  Not Reportable   01/07/17  05:31    


 


Hyposegmented Neuts  Not Reportable   01/07/17  05:31    


 


Hypogranular Neuts  Not Reportable   01/07/17  05:31    


 


Smudge Cells  Not Reportable   01/07/17  05:31    


 


Toxic Granulation  Not Reportable   01/07/17  05:31    


 


Toxic Vacuolation  Not Reportable   01/07/17  05:31    


 


Dohle Bodies  Not Reportable   01/07/17  05:31    


 


Pelger-Huet Anomaly  Not Reportable   01/07/17  05:31    


 


Jose Rods  Not Reportable   01/07/17  05:31    


 


Platelet Estimate  Appe   01/07/17  05:31    


 


Clumped Platelets  Not Reportable   01/07/17  05:31    


 


Plt Clumps, EDTA  Not Reportable   01/07/17  05:31    


 


Large Platelets  Not Reportable   01/07/17  05:31    


 


Giant Platelets  Not Reportable   01/07/17  05:31    


 


Platelet Satelliting  Not Reportable   01/07/17  05:31    


 


Plt Morphology Comment  Not Reportable   01/07/17  05:31    


 


RBC Morphology  Not Reportable   01/07/17  05:31    


 


Dimorphic RBCs  Not Reportable   01/07/17  05:31    


 


Polychromasia  Not Reportable   01/07/17  05:31    


 


Hypochromasia  Not Reportable   01/07/17  05:31    


 


Poikilocytosis  Not Reportable   01/07/17  05:31    


 


Anisocytosis  1+   01/07/17  05:31    


 


Microcytosis  Not Reportable   01/07/17  05:31    


 


Macrocytosis  Not Reportable   01/07/17  05:31    


 


Spherocytes  Not Reportable   01/07/17  05:31    


 


Pappenheimer Bodies  Not Reportable   01/07/17  05:31    


 


Sickle Cells  Not Reportable   01/07/17  05:31    


 


Target Cells  Not Reportable   01/07/17  05:31    


 


Tear Drop Cells  Not Reportable   01/07/17  05:31    


 


Ovalocytes  Not Reportable   01/07/17  05:31    


 


Helmet Cells  Not Reportable   01/07/17  05:31    


 


Leal-[place] Bodies  Not Reportable   01/07/17  05:31    


 


Cabot Rings  Not Reportable   01/07/17  05:31    


 


[place] Cells  Not Reportable   01/07/17  05:31    


 


Bite Cells  Not Reportable   01/07/17  05:31    


 


Crenated Cell  Not Reportable   01/07/17  05:31    


 


Elliptocytes  Not Reportable   01/07/17  05:31    


 


Acanthocytes (Spur)  Not Reportable   01/07/17  05:31    


 


Rouleaux  Not Reportable   01/07/17  05:31    


 


Hemoglobin C Crystals  Not Reportable   01/07/17  05:31    


 


Schistocytes  Not Reportable   01/07/17  05:31    


 


Malaria parasites  Not Reportable   01/07/17  05:31    


 


Abdullahi Bodies  Not Reportable   01/07/17  05:31    


 


Hem Pathologist Commnt  No   01/07/17  05:31    


 


PT  13.6 Sec. (12.2-14.9)   01/06/17  19:10    


 


INR  1.05  (0.87-1.13)   01/06/17  19:10    


 


APTT  35.0 Sec. (24.2-36.6)   01/06/17  19:10    


 


Sodium  137 mmol/L (137-145)   01/06/17  16:27    


 


Potassium  4.3 mmol/L (3.6-5.0)   01/06/17  16:27    


 


Chloride  99.2 mmol/L ()   01/06/17  16:27    


 


Carbon Dioxide  23 mmol/L (22-30)   01/06/17  16:27    


 


Anion Gap  19 mmol/L  01/06/17  16:27    


 


BUN  18 mg/dL (9-20)   01/06/17  16:27    


 


Creatinine  1.5 mg/dL (0.8-1.5)   01/06/17  16:27    


 


Estimated GFR  > 60 ml/min  01/06/17  16:27    


 


BUN/Creatinine Ratio  12.00 %  01/06/17  16:27    


 


Glucose  93 mg/dL ()   01/06/17  16:27    


 


Lactic Acid  1.5 mmol/L (0.7-2.0)   01/06/17  16:27    


 


Calcium  9.2 mg/dL (8.4-10.2)   01/06/17  16:27    


 


Total Creatine Kinase  212 units/L ()  H  01/06/17  19:10    


 


CK-MB (CK-2)  1.3 ng/mL (0.0-4.0)   01/06/17  19:10    


 


CK-MB (CK-2) Rel Index  0.6  (0-4)   01/06/17  19:10    


 


Troponin T  < 0.010 ng/mL (0.00-0.029)   01/06/17  19:10    


 


NT-Pro-B Natriuret Pep  5.73 pg/mL (0-450)   01/06/17  19:10

## 2017-01-10 VITALS — DIASTOLIC BLOOD PRESSURE: 75 MMHG | SYSTOLIC BLOOD PRESSURE: 123 MMHG

## 2017-01-10 RX ADMIN — IPRATROPIUM BROMIDE AND ALBUTEROL SULFATE SCH AMPUL: .5; 3 SOLUTION RESPIRATORY (INHALATION) at 08:27

## 2017-01-10 RX ADMIN — AZITHROMYCIN SCH MG: 250 TABLET, FILM COATED ORAL at 12:05

## 2017-01-10 RX ADMIN — GUAIFENESIN SCH MG: 600 TABLET ORAL at 12:02

## 2017-01-10 RX ADMIN — IPRATROPIUM BROMIDE AND ALBUTEROL SULFATE SCH: .5; 3 SOLUTION RESPIRATORY (INHALATION) at 14:49

## 2017-01-10 NOTE — DISCHARGE SUMMARY
Providers





- Providers


Date of Admission: 


01/06/17 21:51





Date of discharge: 01/10/17


Attending physician: 


NATI SANTOS





Primary care physician: 


PRIMARY CARE MD








Hospitalization


Reason for admission: SOB


Condition: Good


Pertinent studies: 





CXR





CT chest





Hospital course: 





Patient is a 32 yo male, smoker, with asthma who presented to the hospital 

complaining of worsening shortness of breath.  Diagnosed with acute asthma 

exacerbation and treated accordingly with progressive improvement of symptoms 

and ischarged in stable condition with PCP follow-up.





1.  Asthma exacerbation


Started on antibiotic along with IV corticosteroids initialy. then  switched to 

po, inhaled bronchodilators and supplemental oxygen





2.  Tobacco use


Counseled regarding importance of quitting 





3.  Lung nodule


Needs repeat CT chest in 6-12 months








Disposition: DISCHARGED TO HOME OR SELFCARE


Time spent for discharge: 35 min





Core Measure Documentation





- Palliative Care


Palliative Care/ Comfort Measures: Not Applicable





- Core Measures


Any of the following diagnoses?: none





Exam





- Physical Exam


Narrative exam: 


Patient seen and examined:








- Constitutional


Vitals: 


 











Temp Pulse Resp BP Pulse Ox


 


 98.8 F   68   16   119/59   98 


 


 01/10/17 07:40  01/10/17 08:40  01/10/17 08:40  01/10/17 07:40  01/10/17 07:40











General appearance: Present: no acute distress





- Neck


Neck: Present: supple, normal ROM.  Absent: masses or JVD





- Respiratory


Respiratory effort: normal


Respiratory: bilateral: CTA, wheezing (very rare exp wheezes), negative: rales, 

rhonchi





- Cardiovascular


Rhythm: regular


Heart Sounds: Present: S1 & S2.  Absent: systolic murmur





- Extremities


Extremities: no ischemia





- Abdominal


General gastrointestinal: Present: soft, non-tender, non-distended, normal 

bowel sounds





- Neurologic


Neurologic: CNII-XII intact, no focal deficits





Plan


Activity: advance as tolerated


Diet: low cholesterol, low salt


Follow up with: 


PRIMARY CARE,MD [Primary Care Provider] - 7 Days


NESTOR OTOOLE MD [Staff Physician] - 14 Days


Prescriptions: 


ALBUTEROL Inhaler 2 puff IH Q4H PRN #1 


 PRN Reason: Wheezing


predniSONE [Deltasone] 20 mg PO QDAY #14 tab


guaiFENesin ER [Mucinex ER] 600 mg PO BID #10 tablet


Azithromycin [Zithromax TAB] 500 mg PO QDAY #6 tablet

## 2022-05-18 ENCOUNTER — HOSPITAL ENCOUNTER (EMERGENCY)
Dept: HOSPITAL 5 - ED | Age: 37
Discharge: LEFT BEFORE DISCHARGE | End: 2022-05-18
Payer: SELF-PAY

## 2022-05-18 DIAGNOSIS — Z53.21: ICD-10-CM

## 2022-05-18 DIAGNOSIS — F19.239: Primary | ICD-10-CM
